# Patient Record
Sex: MALE | Race: OTHER | HISPANIC OR LATINO | URBAN - NONMETROPOLITAN AREA
[De-identification: names, ages, dates, MRNs, and addresses within clinical notes are randomized per-mention and may not be internally consistent; named-entity substitution may affect disease eponyms.]

---

## 2018-01-04 ENCOUNTER — OFFICE VISIT CONVERTED (OUTPATIENT)
Dept: FAMILY MEDICINE CLINIC | Age: 28
End: 2018-01-04
Attending: NURSE PRACTITIONER

## 2019-08-11 ENCOUNTER — HOSPITAL ENCOUNTER (OUTPATIENT)
Dept: URGENT CARE | Facility: CLINIC | Age: 29
Discharge: HOME | End: 2019-08-11
Attending: NURSE PRACTITIONER

## 2020-10-16 ENCOUNTER — HOSPITAL ENCOUNTER (OUTPATIENT)
Dept: OTHER | Facility: HOSPITAL | Age: 30
Discharge: HOME | End: 2020-10-16
Attending: NURSE PRACTITIONER

## 2020-10-16 ENCOUNTER — OFFICE VISIT CONVERTED (OUTPATIENT)
Dept: FAMILY MEDICINE CLINIC | Age: 30
End: 2020-10-16
Attending: NURSE PRACTITIONER

## 2020-10-16 LAB
ALBUMIN SERPL-MCNC: 4.5 G/DL (ref 3.5–5)
ALBUMIN/GLOB SERPL: 1.7 {RATIO} (ref 1.4–2.6)
ALP SERPL-CCNC: 96 U/L (ref 53–128)
ALT SERPL-CCNC: 53 U/L (ref 10–40)
ANION GAP SERPL CALC-SCNC: 13 MMOL/L (ref 8–19)
AST SERPL-CCNC: 34 U/L (ref 15–50)
BASOPHILS # BLD AUTO: 0.08 10*3/UL (ref 0–0.2)
BASOPHILS NFR BLD AUTO: 1 % (ref 0–3)
BILIRUB SERPL-MCNC: 0.22 MG/DL (ref 0.2–1.3)
BUN SERPL-MCNC: 15 MG/DL (ref 5–25)
BUN/CREAT SERPL: 15 {RATIO} (ref 6–20)
CALCIUM SERPL-MCNC: 9.3 MG/DL (ref 8.7–10.4)
CHLORIDE SERPL-SCNC: 101 MMOL/L (ref 99–111)
CHOLEST SERPL-MCNC: 131 MG/DL (ref 107–200)
CHOLEST/HDLC SERPL: 4.1 {RATIO} (ref 3–6)
CONV ABS IMM GRAN: 0.02 10*3/UL (ref 0–0.2)
CONV CO2: 27 MMOL/L (ref 22–32)
CONV IMMATURE GRAN: 0.3 % (ref 0–1.8)
CONV TOTAL PROTEIN: 7.2 G/DL (ref 6.3–8.2)
CREAT UR-MCNC: 0.99 MG/DL (ref 0.7–1.2)
DEPRECATED RDW RBC AUTO: 35.9 FL (ref 35.1–43.9)
EOSINOPHIL # BLD AUTO: 0.06 10*3/UL (ref 0–0.7)
EOSINOPHIL # BLD AUTO: 0.8 % (ref 0–7)
ERYTHROCYTE [DISTWIDTH] IN BLOOD BY AUTOMATED COUNT: 11.9 % (ref 11.6–14.4)
GFR SERPLBLD BASED ON 1.73 SQ M-ARVRAT: >60 ML/MIN/{1.73_M2}
GLOBULIN UR ELPH-MCNC: 2.7 G/DL (ref 2–3.5)
GLUCOSE SERPL-MCNC: 94 MG/DL (ref 70–99)
HCT VFR BLD AUTO: 46.8 % (ref 42–52)
HDLC SERPL-MCNC: 32 MG/DL (ref 40–60)
HGB BLD-MCNC: 16.3 G/DL (ref 14–18)
LDLC SERPL CALC-MCNC: 63 MG/DL (ref 70–100)
LYMPHOCYTES # BLD AUTO: 1.43 10*3/UL (ref 1–5)
LYMPHOCYTES NFR BLD AUTO: 18.5 % (ref 20–45)
MCH RBC QN AUTO: 29.5 PG (ref 27–31)
MCHC RBC AUTO-ENTMCNC: 34.8 G/DL (ref 33–37)
MCV RBC AUTO: 84.6 FL (ref 80–96)
MONOCYTES # BLD AUTO: 0.36 10*3/UL (ref 0.2–1.2)
MONOCYTES NFR BLD AUTO: 4.7 % (ref 3–10)
NEUTROPHILS # BLD AUTO: 5.77 10*3/UL (ref 2–8)
NEUTROPHILS NFR BLD AUTO: 74.7 % (ref 30–85)
NRBC CBCN: 0 % (ref 0–0.7)
OSMOLALITY SERPL CALC.SUM OF ELEC: 285 MOSM/KG (ref 273–304)
PLATELET # BLD AUTO: 182 10*3/UL (ref 130–400)
PMV BLD AUTO: 10 FL (ref 9.4–12.4)
POTASSIUM SERPL-SCNC: 4.4 MMOL/L (ref 3.5–5.3)
RBC # BLD AUTO: 5.53 10*6/UL (ref 4.7–6.1)
SODIUM SERPL-SCNC: 137 MMOL/L (ref 135–147)
TRIGL SERPL-MCNC: 181 MG/DL (ref 40–150)
VLDLC SERPL-MCNC: 36 MG/DL (ref 5–37)
WBC # BLD AUTO: 7.72 10*3/UL (ref 4.8–10.8)

## 2020-10-29 ENCOUNTER — OFFICE VISIT CONVERTED (OUTPATIENT)
Dept: FAMILY MEDICINE CLINIC | Age: 30
End: 2020-10-29
Attending: NURSE PRACTITIONER

## 2021-06-05 NOTE — PROGRESS NOTES
Eulalio Mora  1990     Office/Outpatient Visit    Visit Date: Fri, Oct 16, 2020 01:56 pm    Provider: Samra Luis N.P. (Assistant: Refugio Hannah, )    Location: [facility]        Electronically signed by Samra Luis N.P. on  10/16/2020 05:19:30 PM                             Subjective:        CC: Mr. Mora is a 30 year old male.  presents today due to trouble sleeping x3 weeks         HPI: Eulalio is here today with c/o difficulty sleeping. Reports that he started seeing neurology Dr Hernandes a couple of years ago as he was having some lightheadedness and fatigue. He has been treating his insomnia and anxiety with  Amitriptyline and Clonazepam. Those are the only medications that he has tried. Last saw Dr Hernandes 8/2020. Notes that they seemed to help at first but is having more trouble sleeping recently. He would also like to have his labs checked.          PHQ-9 Depression Screening: Completed form scanned and in chart; Total Score 9     ROS:     CONSTITUTIONAL:  Negative for chills and fever.      CARDIOVASCULAR:  Negative for chest pain and palpitations.      RESPIRATORY:  Negative for dyspnea and frequent wheezing.      GASTROINTESTINAL:  Negative for abdominal pain and vomiting.      GENITOURINARY:  Negative for dysuria and polyuria.      NEUROLOGICAL:  Negative for dizziness and headaches.      PSYCHIATRIC:  Positive for anxiety and sleep disturbance.   Negative for suicidal thoughts.          Past Medical History / Family History / Social History:         Last Reviewed on 10/16/2020 02:09 PM by Samra Luis    Past Medical History:             PAST MEDICAL HISTORY         Anxiety     Lightheadness     Hospitalizations: Never         CURRENT MEDICAL PROVIDERS:    Neurologist: Dr Hernandes         Surgical History:         head surgery for probably subdural due to trauma 2002;         Family History:     Father: Healthy     Mother: Hypertension;  Anxiety         Social History:      Occupation: ;     Marital Status: Single     Children: None         Tobacco/Alcohol/Supplements:     Last Reviewed on 10/16/2020 01:59 PM by Refugio Hannah    Tobacco: Current Smoker: Currently smokes cigarettes some days.  Non-drinker         Substance Abuse History:     Last Reviewed on 1/04/2018 01:27 PM by Samra Luis        Mental Health History:     Last Reviewed on 1/04/2018 01:27 PM by Samra Luis        Communicable Diseases (eg STDs):     Last Reviewed on 1/04/2018 01:27 PM by Samra Luis        Current Problems:     Last Reviewed on 1/04/2018 01:27 PM by Samra Luis    Encounter for screening for depression    Encounter for immunization        Immunizations:     None        Allergies:     Last Reviewed on 10/16/2020 01:59 PM by Refugio Hannah    No Known Allergies.        Current Medications:     Last Reviewed on 10/16/2020 02:00 PM by Refugio Hannah    clonazePAM 0.5 mg oral tablet [take 1 tablet (0.5 mg) by oral route 3 times per day prn]    amitriptyline 25 mg oral tablet [take 1 tablet (25 mg) by oral route qhs]        Objective:        Vitals:         Current: 10/16/2020 2:01:58 PM    Ht:  5 ft, 5.5 in;  Wt: 169.4 lbs;  BMI: 27.8T: 97.8 F (temporal);  BP: 119/83 mm Hg (left arm, sitting);  P: 87 bpm (left arm (BP Cuff), sitting);  sCr: 0.72 mg/dL;  GFR: 136.31        Exams:     PHYSICAL EXAM:     GENERAL: well developed, well nourished;  no apparent distress;     RESPIRATORY: normal respiratory rate and pattern with no distress; normal breath sounds with no rales, rhonchi, wheezes or rubs;     CARDIOVASCULAR: normal rate; rhythm is regular;     NEUROLOGIC: mental status: alert and oriented x 3; GROSSLY INTACT     PSYCHIATRIC: appropriate affect and demeanor; normal speech pattern; normal thought and perception;         Procedures:     Encounter for immunization    1. Patient experienced no reaction.  Regarding contraindications to an Influenza vaccine: Denies moderate/severe illness  with/without fever; serious reaction to eggs, egg proteins, gentamicin, gelatin, arginine, neomycin or polymixin; serious reaction after recieving previous influenza vaccines; and history of Guillain-[place] Syndrome.              Assessment:         F41.1   Generalized anxiety disorder       Z13.31   Encounter for screening for depression       Z23   Encounter for immunization       Z13.6   Encounter for screening for cardiovascular disorders           ORDERS:         Meds Prescribed:       [New Rx] amitriptyline 50 mg oral tablet [take 1 tablet (50 mg) by oral route once nightly], #30 (thirty) tablets, Refills: 1 (one)         Lab Orders:       [id number]  [facility] CBC with 3 part diff  (Send-Out)            45368  COMP - [facility] Comp. Metabolic Panel  (Send-Out)            13660  LPDP - [facility] Lipid Panel  (Send-Out)              Procedures Ordered:       [id number]  Immunization administration; one vaccine  (In-House)              Other Orders:       92878  Influenza virus vaccine, quadrivalent, split virus, preservative free 3 years of age & older  (In-House)              Depression screen positive and follow up plan documented  (In-House)                      Plan:         Generalized anxiety disorderWill increase dose of Amitriptyline to 50 mg daily. Continue follow up with Dr Hernandes as per his recommendations. Will be due next month. He is prescribing his Clonazepam.     MIPS Vaccines Flu and Pneumonia updated in Shot record     FOLLOW-UP: Chronic visit follow up           Prescriptions:       [New Rx] amitriptyline 50 mg oral tablet [take 1 tablet (50 mg) by oral route once nightly], #30 (thirty) tablets, Refills: 1 (one)         Encounter for screening for depression    MIPS PHQ-9 Depression Screening: Completed form scanned and in chart; Total Score 9 Positive Depression Screen: Suicide Risk Assessment completed--denies suicidal/homicidal ideation; Pharmacologic intervention initiated/modified           Orders:          Depression screen positive and follow up plan documented  (In-House)              Encounter for immunization          Immunizations:       69281  Immunization administration; one vaccine  (In-House)            10256  Influenza virus vaccine, quadrivalent, split virus, preservative free 3 years of age & older  (In-House)                Dose (ml): 0.5  Site: right deltoid  Route: intramuscular  Administered by: Refugio Hannah          : [employer]  Lot #: [id number]  Exp: 06/30/2021          NDC: [address]-40        Encounter for screening for cardiovascular disordersWill return for fasting labs    LABORATORY:  Labs ordered to be performed today include CBC, Comprehensive metabolic panel, and lipid panel.      RECOMMENDATIONS given include: Further recommendation to be given after test results are complete.            Orders:       [id number]  BDCBC - [facility] CBC with 3 part diff  (Send-Out)            39288  COMP - [facility] Comp. Metabolic Panel  (Send-Out)            96117  [facility] - [facility] Lipid Panel  (Send-Out)                  Charge Capture:         Primary Diagnosis:     F41.1  Generalized anxiety disorder           Orders:      20727  Office/outpatient visit; established patient, level 3  (In-House)              Z13.31  Encounter for screening for depression           Orders:        Depression screen positive and follow up plan documented  (In-House)              Z23  Encounter for immunization           Orders:      47861  Immunization administration; one vaccine  (In-House)            01937  Influenza virus vaccine, quadrivalent, split virus, preservative free 3 years of age & older  (In-House)              Z13.6  Encounter for screening for cardiovascular disorders

## 2021-06-05 NOTE — PROGRESS NOTES
Eulalio Mora  1990     Office/Outpatient Visit    Visit Date: Thu, Oct 29, 2020 10:05 am    Provider: Samra Luis N.P. (Assistant: Refugio Hannah, )    Location: [facility]        Electronically signed by Samra Luis N.P. on  10/29/2020 11:11:47 AM                             Subjective:        CC: Mr. Mora is a 30 year old male.  This is a follow-up visit.          HPI: Eulalio is here today as he wishes to review his recent labs.    Additionally, he c/o heartburn especially after drinking coffee.    At last appointment, his dose of Amitriptyline was increased to try to help with sleep. He notes that it easier to fall asleep but does note some next day drowsiness. He plans to give it some more time to see if he adjusts. Has appt with Dr Hernandes in a couple of weeks. He is also on Clonazepam prescribed by Dr Hernandes.    ROS:     CONSTITUTIONAL:  Negative for chills and fever.      CARDIOVASCULAR:  Negative for chest pain and palpitations.      RESPIRATORY:  Negative for dyspnea and frequent wheezing.      GASTROINTESTINAL:  Positive for acid reflux symptoms.   Negative for constipation, diarrhea or vomiting.      PSYCHIATRIC:  Positive for sleep disturbance.   Negative for suicidal thoughts.          Past Medical History / Family History / Social History:         Last Reviewed on 10/16/2020 02:09 PM by Samra Luis    Past Medical History:             PAST MEDICAL HISTORY         Anxiety     Lightheadness     Hospitalizations: Never         CURRENT MEDICAL PROVIDERS:    Neurologist: Dr Hernandes         Surgical History:         head surgery for probably subdural due to trauma 2002;         Family History:     Father: Healthy     Mother: Hypertension;  Anxiety         Social History:     Occupation: ;     Marital Status: Single     Children: None         Tobacco/Alcohol/Supplements:     Last Reviewed on 10/16/2020 01:59 PM by Refugio Hannah    Tobacco: Current Smoker: Currently smokes  cigarettes some days.  Non-drinker         Substance Abuse History:     Last Reviewed on 1/04/2018 01:27 PM by Samra Luis        Mental Health History:     Last Reviewed on 1/04/2018 01:27 PM by Samra Luis        Communicable Diseases (eg STDs):     Last Reviewed on 1/04/2018 01:27 PM by Samra Luis        Current Problems:     Last Reviewed on 1/04/2018 01:27 PM by Samra Luis    Generalized anxiety disorder    Encounter for screening for depression    Encounter for screening for cardiovascular disorders    Encounter for immunization        Immunizations:     influenza, injectable, quadrivalent, preservative free (AFLURIA QUAD 2020-21 (3YR UP)) 10/16/2020        Allergies:     Last Reviewed on 10/16/2020 01:59 PM by Refugio Hannah    No Known Allergies.        Current Medications:     Last Reviewed on 10/16/2020 02:00 PM by Refugio Hannah    clonazePAM 0.5 mg oral tablet [take 1 tablet (0.5 mg) by oral route 3 times per day prn]    amitriptyline 25 mg oral tablet [take 1 tablet (25 mg) by oral route qhs]    amitriptyline 50 mg oral tablet [take 1 tablet (50 mg) by oral route once nightly]        Objective:        Vitals:         Current: 10/29/2020 10:09:22 AM    Ht:  5 ft, 5.5 in;  Wt: 167.8 lbs;  BMI: 27.5T: 97.5 F (temporal);  BP: 124/89 mm Hg (right arm, sitting);  P: 78 bpm (right arm (BP Cuff), sitting);  sCr: 0.99 mg/dL;  GFR: 98.74        Exams:     PHYSICAL EXAM:     GENERAL: well developed, well nourished;  no apparent distress;     RESPIRATORY: normal respiratory rate and pattern with no distress; normal breath sounds with no rales, rhonchi, wheezes or rubs;     CARDIOVASCULAR: normal rate; rhythm is regular;     GASTROINTESTINAL: nontender; normal bowel sounds; no organomegaly;     NEUROLOGIC: mental status: alert and oriented x 3; GROSSLY INTACT     PSYCHIATRIC: appropriate affect and demeanor;         Assessment:         K21.9   Gastro-esophageal reflux disease without esophagitis       F41.1    Generalized anxiety disorder           ORDERS:         Meds Prescribed:       [New Rx] omeprazole 40 mg oral capsule,delayed release (enteric coated) [take 1 capsule (40 mg) by oral route once daily], #30 (thirty) capsules, Refills: 2 (two)                 Plan: Labs reviewed with patient. Advised low cholesterol diet and regular exercise. Cholesterol handout provided. Advised follow up one year for recheck of CMP, fasting lipid panel. He was not fasting at the time labs were drawn.         Gastro-esophageal reflux disease without esophagitisTrial of PPI along with dietary changes. F/U for persistent or worsening symptoms.        RECOMMENDATIONS given include: discontinuation (or at least limitation) of alcohol, avoidance of spicy foods, avoid eating 3-4 hours before bed, and reduce caffiene.            Prescriptions:       [New Rx] omeprazole 40 mg oral capsule,delayed release (enteric coated) [take 1 capsule (40 mg) by oral route once daily], #30 (thirty) capsules, Refills: 2 (two)         Generalized anxiety disorderDiscussed with patient. Will continue current medications and keep scheduled follow up with Dr Hernandes.             Patient Recommendations:        For  Gastro-esophageal reflux disease without esophagitis:    Avoid alcohol. Avoid spicy foods if they tend to make your symptoms worse. Avoid eating 3-4 hours before bedtime.              Charge Capture:         Primary Diagnosis:     K21.9  Gastro-esophageal reflux disease without esophagitis           Orders:      09755  Office/outpatient visit; established patient, level 3  (In-House)              F41.1  Generalized anxiety disorder

## 2021-06-05 NOTE — PROGRESS NOTES
Eulalio Mora 1990     Office/Outpatient Visit    Visit Date: Thu, Jan 4, 2018 01:06 pm    Provider: Samra Luis N.P. (Assistant: Georgia Denise LPN)    Location: [facility]        Electronically signed by Samra Luis N.P. on  01/04/2018 06:45:32 PM                             SUBJECTIVE:        CC:     Mr. Mora is a 27 year old male.  This is his first visit to the clinic.  c/o feeling nervous in big crowds, and trouble sleeping and focusing, feels pressure and heat on the back of his head daily. Went to [facility] er about 3 weeks ago.          HPI: Eulalio presents with friend to help translate as he speaks Marshallese. He presents with c/o symptoms that started 3-4 weeks ago. He has been having pain in the back of his head. He has numerous episodes during the day with feeling nervous, dizzy, and blurred vision. They last about 3-4 minutes at a time. He notes that light is bothering him. He also feels tired. He was seen at [facility] ER. CT scan head negative. He was given prescription for Ibuprofen which does not seem to help as well as Butalbital/acetaminophen/caffeine which helps only slightly. It was recommended for him to follow up with neurologist but he has not made appointment. He has history of head injury followed by surgery for what sounds like a subdural hematoma.                     ROS:     CONSTITUTIONAL:  Positive for fatigue.   Negative for chills or fever.      EYES:  Positive for blurred vision.   Negative for eye drainage.      E/N/T:  Negative for ear pain and sore throat.      CARDIOVASCULAR:  Negative for chest pain and palpitations.      RESPIRATORY:  Negative for dyspnea and frequent wheezing.      MUSCULOSKELETAL:  Negative for joint stiffness and myalgias.      INTEGUMENTARY:  Negative for rash.      NEUROLOGICAL:  Positive for dizziness and headaches.      ENDOCRINE:  Negative for polydipsia and polyphagia.      PSYCHIATRIC:  Negative for depression and suicidal thoughts.           PMH/FMH/SH:     Last Reviewed on 1/04/2018 01:27 PM by Samra Luis    Past Medical History:             PAST MEDICAL HISTORY     UNREMARKABLE     Hospitalizations: Never         Surgical History:         surgery to remove blood after head injury in 2002;         Family History:     Father: Healthy     Mother: Hypertension;  Anxiety         Social History:     Occupation: ;     Marital Status: Single     Children: None         Tobacco/Alcohol/Supplements:     Last Reviewed on 1/04/2018 01:27 PM by Samra Luis    Tobacco: Past history of cigarette smoking, but has quit.          Alcohol: Frequency: Socially         Substance Abuse History:     Last Reviewed on 1/04/2018 01:27 PM by Samra Luis        Mental Health History:     Last Reviewed on 1/04/2018 01:27 PM by Samra Luis        Communicable Diseases (eg STDs):     Last Reviewed on 1/04/2018 01:27 PM by Samra Luis            Current Problems:     Last Reviewed on 1/04/2018 01:27 PM by Samra Luis      None Recorded         Immunizations:     None        Allergies:     Last Reviewed on 1/04/2018 01:27 PM by Samra Luis      No Known Drug Allergies.         Current Medications:     Last Reviewed on 1/04/2018 01:27 PM by Samra Luis    Butalbital/Acetaminophen/Caffeine 50mg/325mg/40mg Capsules 1-2 tabs every 4-6 hrs prn for migraines     Ibuprofen 600mg Tablet 1 tab q8hrs prn         OBJECTIVE:        Vitals:         Current: 1/4/2018 1:09:18 PM    Ht:  5 ft, 5.5 in;  Wt: 142 lbs;  BMI: 23.3    T: 97.5 F;  BP: 132/80 mm Hg;  P: 78 bpm        Exams:     PHYSICAL EXAM:     GENERAL: well developed, well nourished;  no apparent distress;     EYES: PERRL, EOMI     E/N/T: EARS: external auditory canal normal;  bilateral TMs are normal;  NOSE: normal turbinates; no sinus tenderness; OROPHARYNX: oral mucosa is normal; posterior pharynx shows no exudate;     NECK: range of motion is normal; trachea is midline;     RESPIRATORY: normal respiratory rate and  pattern with no distress; normal breath sounds with no rales, rhonchi, wheezes or rubs;     CARDIOVASCULAR: normal rate; rhythm is regular;     GASTROINTESTINAL: nontender; normal bowel sounds; no organomegaly;     MUSCULOSKELETAL: normal gait; normal range of motion of all major muscle groups; no limb or joint pain with range of motion;     NEUROLOGIC: mental status: alert and oriented x 3; GROSSLY INTACT     PSYCHIATRIC: appropriate affect and demeanor;         ASSESSMENT           784.0   R51  Headache              DDx:         ORDERS:         Lab Orders:       [id number]  [address] CBC w/o diff  (Send-Out)         42252  [facility] Comp. Metabolic Panel  (Send-Out)         60586  [facility] TSH  (Send-Out)           Procedures Ordered:       REFER  Referral to Specialist or Other Facility  (Send-Out)                   PLAN:              Headache Concerned that this may be related to possible absent type seizure with his history. Strongly advised follow up with neurology. Also recommend follow up with optometrist. Will check labs today. Further recommendations to follow. To ER if symptoms worsen.         LABORATORY:  Labs ordered to be performed today include CBC W/O DIFF, Comprehensive metabolic panel, and TSH.      REFERRALS:  Referral initiated to a neurologist ( Dr. Jc Dias [facility] ).      FOLLOW-UP: Schedule follow-up appointments on a p.r.n. basis. Chronic visit follow up           Orders:       REFER  Referral to Specialist or Other Facility  (Send-Out)         [address]  [address] CBC w/o diff  (Send-Out)         47827  [facility] Comp. Metabolic Panel  (Send-Out)         32510  [facility] TSH  (Send-Out)               Patient Recommendations:        For  Headache:     Schedule follow-up appointments as needed.              CHARGE CAPTURE           **Please note: ICD descriptions below are intended for billing purposes only and may not represent clinical diagnoses**        Primary Diagnosis:          784.0 Headache            R51    Headache              Orders:          09679   Office visit - new pt, level 3  (In-House)               ADDENDUMS:      ____________________________________    Addendum: 01/08/2018 01:09 PM - Nadira Mendez         Visit Note Faxed to:        Jc Dias  (Neurology); Number [phone]     Health Summary Faxed to:        Jc Dias  (Neurology); Number [phone]

## 2021-07-01 VITALS
SYSTOLIC BLOOD PRESSURE: 132 MMHG | TEMPERATURE: 97.5 F | BODY MASS INDEX: 22.82 KG/M2 | DIASTOLIC BLOOD PRESSURE: 80 MMHG | WEIGHT: 142 LBS | HEIGHT: 66 IN | HEART RATE: 78 BPM

## 2021-07-02 VITALS
TEMPERATURE: 97.8 F | WEIGHT: 169.4 LBS | DIASTOLIC BLOOD PRESSURE: 83 MMHG | BODY MASS INDEX: 27.23 KG/M2 | HEART RATE: 87 BPM | HEIGHT: 66 IN | SYSTOLIC BLOOD PRESSURE: 119 MMHG

## 2021-07-02 VITALS
DIASTOLIC BLOOD PRESSURE: 89 MMHG | HEART RATE: 78 BPM | WEIGHT: 167.8 LBS | SYSTOLIC BLOOD PRESSURE: 124 MMHG | HEIGHT: 66 IN | BODY MASS INDEX: 26.97 KG/M2 | TEMPERATURE: 97.5 F